# Patient Record
Sex: FEMALE | Race: WHITE | NOT HISPANIC OR LATINO | Employment: STUDENT | URBAN - METROPOLITAN AREA
[De-identification: names, ages, dates, MRNs, and addresses within clinical notes are randomized per-mention and may not be internally consistent; named-entity substitution may affect disease eponyms.]

---

## 2020-04-16 ENCOUNTER — OFFICE VISIT (OUTPATIENT)
Dept: FAMILY MEDICINE CLINIC | Facility: CLINIC | Age: 35
End: 2020-04-16

## 2020-04-16 DIAGNOSIS — F41.8 MIXED ANXIETY DEPRESSIVE DISORDER: Primary | ICD-10-CM

## 2020-04-16 PROCEDURE — 99213 OFFICE O/P EST LOW 20 MIN: CPT | Performed by: FAMILY MEDICINE

## 2020-04-16 RX ORDER — LORAZEPAM 0.5 MG/1
1 TABLET ORAL 4 TIMES DAILY
COMMUNITY
Start: 2017-07-05 | End: 2020-04-16 | Stop reason: SDUPTHER

## 2020-04-16 RX ORDER — LORAZEPAM 0.5 MG/1
0.5 TABLET ORAL 4 TIMES DAILY
Qty: 28 TABLET | Refills: 1 | Status: SHIPPED | OUTPATIENT
Start: 2020-04-16

## 2020-04-16 RX ORDER — SERTRALINE HYDROCHLORIDE 100 MG/1
200 TABLET, FILM COATED ORAL EVERY 24 HOURS
COMMUNITY
Start: 2019-03-31 | End: 2020-04-23

## 2020-04-16 NOTE — PROGRESS NOTES
Subjective   Sharlene Escobedo is a 34 y.o. female.     Chief Complaint   Patient presents with   • Depression       HPI  Chief complaint: Depression    The patient is a 34-year-old white female who was seen in follow-up by telephone visit. She agreed to be seen by telephone visit today.    Patient was seen was for follow-up of her depression.  Patient has a history of intermittent depression that at times in the past has been severe.  Patient in the past has been on Zoloft and Ativan.  The patient was on Zoloft 200 mg a day and Ativan 0.5 4 times a day as needed.  The patient is not taking any meds in several weeks to months and was doing well.  Patient had a recent break-up with a significant other.  She states she has had increased depression and anxiety since then.  Patient states she is having crying spells.  Patient states she is having difficulty concentrating.  The patient is not suicidal or homicidal.        The following portions of the patient's history were reviewed and updated as appropriate: allergies, current medications, past family history, past medical history, past social history, past surgical history and problem list.    Review of Systems   Constitutional: Positive for fatigue. Negative for chills and fever.   HENT: Negative for congestion, sinus pressure and swollen glands.    Eyes: Negative for blurred vision and pain.   Respiratory: Negative for cough and shortness of breath.    Cardiovascular: Negative for chest pain and leg swelling.   Gastrointestinal: Positive for nausea. Negative for abdominal pain and indigestion.   Endocrine: Negative for cold intolerance, heat intolerance, polydipsia, polyphagia and polyuria.   Skin: Negative for dry skin, rash and bruise.   Neurological: Negative for dizziness, syncope and headache.   Psychiatric/Behavioral: Positive for decreased concentration, dysphoric mood, sleep disturbance and depressed mood. Negative for stress. The patient is nervous/anxious.         Objective     There were no vitals taken for this visit.    Physical Exam      Assessment/Plan   Sharlene was seen today for depression.    Diagnoses and all orders for this visit:    Mixed anxiety depressive disorder-I discussed patient's condition with the patient and her father.  At this point time I recommended starting Zoloft 50 mg once a day and using Ativan as needed.  She was cautioned with regard to anterograde amnesia and the Ativan.  Patient is currently staying with her parents.  While the patient is not currently suicidal they were advised to keep a close eye on the patient.      Patient Instructions   Take the Zoloft and the Ativan as prescribed.    Follow up in the office in 1 week.    Consider going back to Counseling.    Further care will depend on the hare you progress.          Ermias Strong Jr., MD    04/16/20

## 2020-04-16 NOTE — PATIENT INSTRUCTIONS
Take the Zoloft and the Ativan as prescribed.    Follow up in the office in 1 week.    Consider going back to Counseling.    Further care will depend on the hare you progress.

## 2020-04-23 ENCOUNTER — TELEPHONE (OUTPATIENT)
Dept: FAMILY MEDICINE CLINIC | Facility: CLINIC | Age: 35
End: 2020-04-23

## 2020-04-23 RX ORDER — SERTRALINE HYDROCHLORIDE 100 MG/1
100 TABLET, FILM COATED ORAL DAILY
Qty: 30 TABLET | Refills: 2 | Status: SHIPPED | OUTPATIENT
Start: 2020-04-23

## 2021-09-30 NOTE — TELEPHONE ENCOUNTER
I spoke with Sharlene.  She is to increase the Sertraline 100 mgm a day.  She is to follow up in the office in 4 weeks.   36.9